# Patient Record
Sex: MALE | Race: BLACK OR AFRICAN AMERICAN | URBAN - METROPOLITAN AREA
[De-identification: names, ages, dates, MRNs, and addresses within clinical notes are randomized per-mention and may not be internally consistent; named-entity substitution may affect disease eponyms.]

---

## 2022-05-09 ENCOUNTER — OFFICE VISIT (OUTPATIENT)
Dept: URBAN - METROPOLITAN AREA CLINIC 92 | Facility: CLINIC | Age: 52
End: 2022-05-09

## 2022-09-12 ENCOUNTER — WEB ENCOUNTER (OUTPATIENT)
Dept: URBAN - METROPOLITAN AREA CLINIC 92 | Facility: CLINIC | Age: 52
End: 2022-09-12

## 2022-09-12 ENCOUNTER — DASHBOARD ENCOUNTERS (OUTPATIENT)
Age: 52
End: 2022-09-12

## 2022-09-12 ENCOUNTER — OFFICE VISIT (OUTPATIENT)
Dept: URBAN - METROPOLITAN AREA CLINIC 92 | Facility: CLINIC | Age: 52
End: 2022-09-12
Payer: COMMERCIAL

## 2022-09-12 VITALS
WEIGHT: 225 LBS | TEMPERATURE: 97.1 F | HEIGHT: 70 IN | BODY MASS INDEX: 32.21 KG/M2 | DIASTOLIC BLOOD PRESSURE: 78 MMHG | SYSTOLIC BLOOD PRESSURE: 124 MMHG | HEART RATE: 73 BPM

## 2022-09-12 DIAGNOSIS — R63.0 LOSS OF APPETITE: ICD-10-CM

## 2022-09-12 DIAGNOSIS — Z12.11 COLON CANCER SCREENING: ICD-10-CM

## 2022-09-12 PROCEDURE — 99204 OFFICE O/P NEW MOD 45 MIN: CPT | Performed by: INTERNAL MEDICINE

## 2022-09-12 NOTE — PHYSICAL EXAM EYES:
7/16/2021  Primary cardiologist: Dr. Lolly Hidalgo  is an established 70 Conjuntivae and eyelids appear normal, Sclerae : White without injection Jessie Valdes in 1 year. Signed:  RAVINDER Rivera - CNP, 7/16/2021, 8:34 AM    An electronic signature was used to authenticate this note.

## 2022-09-13 PROBLEM — 79890006: Status: ACTIVE | Noted: 2022-09-12

## 2022-09-13 PROBLEM — 305058001: Status: ACTIVE | Noted: 2022-09-13

## 2022-11-01 ENCOUNTER — OFFICE VISIT (OUTPATIENT)
Dept: URBAN - METROPOLITAN AREA SURGERY CENTER 16 | Facility: SURGERY CENTER | Age: 52
End: 2022-11-01

## 2022-11-15 ENCOUNTER — OFFICE VISIT (OUTPATIENT)
Dept: URBAN - METROPOLITAN AREA SURGERY CENTER 16 | Facility: SURGERY CENTER | Age: 52
End: 2022-11-15

## 2023-02-21 NOTE — HPI-TODAY'S VISIT:
1 IV attempt  No redness at site 53 yo male for colonoscopy. none prior. had longstanding abdominal paain and GI upset. although all resolved with quitting etoh and spicey foods. now some mild loss of appetite and 5-8 lbs weight loss
